# Patient Record
Sex: MALE | Race: WHITE | Employment: UNEMPLOYED | URBAN - METROPOLITAN AREA
[De-identification: names, ages, dates, MRNs, and addresses within clinical notes are randomized per-mention and may not be internally consistent; named-entity substitution may affect disease eponyms.]

---

## 2024-02-07 ENCOUNTER — APPOINTMENT (OUTPATIENT)
Dept: GENERAL RADIOLOGY | Age: 34
End: 2024-02-07
Attending: NURSE PRACTITIONER
Payer: COMMERCIAL

## 2024-02-07 ENCOUNTER — HOSPITAL ENCOUNTER (OUTPATIENT)
Age: 34
Discharge: HOME | End: 2024-02-07
Payer: COMMERCIAL

## 2024-02-07 VITALS
RESPIRATION RATE: 18 BRPM | TEMPERATURE: 99 F | HEART RATE: 94 BPM | OXYGEN SATURATION: 98 % | WEIGHT: 170 LBS | DIASTOLIC BLOOD PRESSURE: 74 MMHG | SYSTOLIC BLOOD PRESSURE: 123 MMHG

## 2024-02-07 DIAGNOSIS — J18.9 COMMUNITY ACQUIRED PNEUMONIA OF LEFT LOWER LOBE OF LUNG: Primary | ICD-10-CM

## 2024-02-07 DIAGNOSIS — R09.89 SYMPTOMS OF UPPER RESPIRATORY INFECTION (URI): ICD-10-CM

## 2024-02-07 DIAGNOSIS — R07.81 RIB PAIN ON LEFT SIDE: ICD-10-CM

## 2024-02-07 LAB
POCT INFLUENZA A: NEGATIVE
POCT INFLUENZA B: NEGATIVE
SARS-COV-2 RNA RESP QL NAA+PROBE: NOT DETECTED

## 2024-02-07 PROCEDURE — 71101 X-RAY EXAM UNILAT RIBS/CHEST: CPT | Performed by: NURSE PRACTITIONER

## 2024-02-07 PROCEDURE — U0002 COVID-19 LAB TEST NON-CDC: HCPCS | Performed by: NURSE PRACTITIONER

## 2024-02-07 PROCEDURE — S0119 ONDANSETRON 4 MG: HCPCS | Performed by: NURSE PRACTITIONER

## 2024-02-07 PROCEDURE — 87502 INFLUENZA DNA AMP PROBE: CPT | Performed by: NURSE PRACTITIONER

## 2024-02-07 PROCEDURE — 99204 OFFICE O/P NEW MOD 45 MIN: CPT | Performed by: NURSE PRACTITIONER

## 2024-02-07 RX ORDER — ONDANSETRON 4 MG/1
8 TABLET, ORALLY DISINTEGRATING ORAL ONCE
Status: COMPLETED | OUTPATIENT
Start: 2024-02-07 | End: 2024-02-07

## 2024-02-07 RX ORDER — ONDANSETRON 4 MG/1
4 TABLET, ORALLY DISINTEGRATING ORAL ONCE
Status: DISCONTINUED | OUTPATIENT
Start: 2024-02-07 | End: 2024-02-07

## 2024-02-07 RX ORDER — AMOXICILLIN 500 MG/1
1000 TABLET, FILM COATED ORAL 3 TIMES DAILY
Qty: 30 TABLET | Refills: 0 | Status: SHIPPED | OUTPATIENT
Start: 2024-02-07 | End: 2024-02-12

## 2024-02-07 RX ORDER — AZITHROMYCIN 250 MG/1
TABLET, FILM COATED ORAL
Qty: 6 TABLET | Refills: 0 | Status: SHIPPED | OUTPATIENT
Start: 2024-02-07

## 2024-02-07 NOTE — DISCHARGE INSTRUCTIONS
Pneumonia care measures   - Take antibiotics as directed and to completion   - you may benefit from taking probiotics whenever you take antibiotics   - You are considered contagious for 24 hours from starting antibiotics and/or for 24 hours after your fever (>100.4 ) resolves   - Tylenol or Ibuprofen as needed for pain and/or fever   - Drink plenty of water   - Avoid strenuous activity in the short term, and slowly progress activity as tolerated   - Deep breathing is also good for clearing the mucus from your lungs: breathe deeply five to ten times and then cough or fontenot strongly a couple of times to move the mucus.   - The lungs are a major body organ and can take a few weeks / months to regain their initial capacity after an infection   - Close follow up with care team as you may require a repeat chest x-ray in 4-6 weeks to ensure resolution of infection   - Go to ED if breathing worsens

## 2024-02-07 NOTE — ED PROVIDER NOTES
History     Chief Complaint   Patient presents with    Back Pain       Subjective:   HPI    Madhav Mitchell, 33 year old male with notable medical history of n/a who presents with Left sided thoracic pain and not feeling well. Patient reports visiting a chiropractor Monday which he has done for the past few weeks, and denied any immediate complaints. He started to develop Left sided thoracic pain that evening and reports worsening of pain with laughing and deep breathing.     Last night, he reports starting to have a fever, chills, diaphoresis and did not feel well. These symptoms persisted and worsened today. Attempted to eat this morning, but it did not sit right with him. He has been taking Ibuprofen for chills.    Denies SOB, abdominal pain.        Objective:   History reviewed. No pertinent past medical history.           History reviewed. No pertinent surgical history.             No pertinent social history.            Review of Systems   Constitutional:  Positive for chills, diaphoresis and fatigue.   Gastrointestinal:  Positive for nausea and vomiting.   All other systems reviewed and are negative.      Constitutional and vital signs reviewed.      All other systems reviewed and negative except as noted above.    I have reviewed the family history, social history, allergies, and outpatient medications.     History reviewed from EMR: Encounters, problem list, allergies, medications      Physical Exam     ED Triage Vitals [02/07/24 1030]   /74   Pulse 94   Resp 18   Temp 99 °F (37.2 °C)   Temp src Temporal   SpO2 98 %   O2 Device None (Room air)       Current:/74   Pulse 94   Temp 99 °F (37.2 °C) (Temporal)   Resp 18   Wt 77.1 kg   SpO2 98%       Physical Exam  Vitals and nursing note reviewed.   Constitutional:       General: He is not in acute distress.     Appearance: Normal appearance. He is normal weight. He is ill-appearing. He is not toxic-appearing.   HENT:      Head:  Normocephalic and atraumatic.      Right Ear: External ear normal.      Left Ear: External ear normal.      Nose: Nose normal. No congestion or rhinorrhea.      Mouth/Throat:      Mouth: Mucous membranes are moist.   Eyes:      Extraocular Movements: Extraocular movements intact.      Conjunctiva/sclera: Conjunctivae normal.      Pupils: Pupils are equal, round, and reactive to light.   Cardiovascular:      Rate and Rhythm: Normal rate.      Pulses: Normal pulses.   Pulmonary:      Effort: Pulmonary effort is normal. No respiratory distress.      Breath sounds: Normal breath sounds.      Comments: LS clear  Chest:      Comments: Left lateral thoracic pain  Musculoskeletal:         General: No swelling, tenderness or signs of injury. Normal range of motion.      Cervical back: Normal range of motion.   Skin:     General: Skin is warm and dry.      Capillary Refill: Capillary refill takes less than 2 seconds.      Coloration: Skin is pale.   Neurological:      General: No focal deficit present.      Mental Status: He is alert and oriented to person, place, and time. Mental status is at baseline.   Psychiatric:         Mood and Affect: Mood normal.         Behavior: Behavior normal.         Thought Content: Thought content normal.         Judgment: Judgment normal.            ED Course     Labs Reviewed   RAPID SARS-COV-2 BY PCR - Normal   POCT FLU TEST - Normal    Narrative:     This assay is a rapid molecular in vitro test utilizing nucleic acid amplification of influenza A and B viral RNA.     XR RIBS WITH CHEST (3 VIEWS), LEFT  (CPT=71101)   Final Result   PROCEDURE:  XR RIBS WITH CHEST (3 VIEWS), LEFT  (CPT=71101)       TECHNIQUE:  PA Chest and three views of the ribs were obtained       COMPARISON:  None.       INDICATIONS:  Left sided rib pain after chiropractic visit Monday       PATIENT STATED HISTORY: (As transcribed by Technologist)  Left sided rib    pain after chiropractic visit Monday. Non smoker. Patient  states a cough    started monday also            FINDINGS:  No displaced or nondisplaced rib fracture.  No pneumothorax or    pleural effusion.  Patchy opacities are seen corresponding to the left    lower lobe.                             =====   CONCLUSION:     1. Negative for rib fracture.   2. Patchy opacities in the left lower lobe.  These may reflect findings of    lobar pneumonia especially with the given symptoms of cough.  Follow-up    recommended to ensure resolution.             LOCATION:  [facility]                   Dictated by (CST): Isaias Del Toro MD on 2/07/2024 at 11:06 AM        Finalized by (CST): Isaias Del Toro MD on 2/07/2024 at 11:08 AM             Vitals:    02/07/24 1030   BP: 123/74   Pulse: 94   Resp: 18   Temp: 99 °F (37.2 °C)   TempSrc: Temporal   SpO2: 98%   Weight: 77.1 kg            [facility]        Madhav Mitchell, 33 year old male with medical history as noted above who presents with thoracic pain and not feeling well   - Patient in NAD, VSS, appears unwell   - gastroenteritis vs viral illness vs intercostal strain vs fracture vs PNA vs other   - Patient with emesis episode during interview   - viral swabs and CXR ordered   - Zofran 8mng ODT ordered       ** See ED course for additional information on care provided / interventions / notable events throughout patient's encounter.    ** See below for home care instructions (if applicable)    ED Course as of 02/07/24 1118  ------------------------------------------------------------  Time: 02/07 1109  Comment: Self read of Xray suspicious for Left sided opacity. Awaiting official read.  ------------------------------------------------------------  Time: 02/07 1117  Comment: Radiology noting LLL opacity. Given HPI and exam and appearance, suggestive of PNA.   Supportive care and infection control measures discussed  Work note provided  Advised to establish care with primary care provider        I have independently reviewed the radiology images,  clinical lab results, and ECG tracings as described above (if applicable)        Medical Decision Making  Amount and/or Complexity of Data Reviewed  Labs: ordered. Decision-making details documented in ED Course.  Radiology: ordered and independent interpretation performed. Decision-making details documented in ED Course.    Risk  OTC drugs.  Prescription drug management.        Disposition and Plan     Clinical Impression:  1. Community acquired pneumonia of left lower lobe of lung    2. Rib pain on left side    3. Symptoms of upper respiratory infection (URI)         Disposition:  Discharge  2/7/2024 11:16 am    Follow-up:  [facility], [facility], [place] [address] [facility] [address]  [facility] [id number] [phone]    New primary care provider referral (if needed)        Medications Prescribed:  Current Discharge Medication List        START taking these medications    Details   amoxicillin 500 MG Oral Tab Take 2 tablets (1,000 mg total) by mouth 3 (three) times daily for 5 days.  Qty: 30 tablet, Refills: 0      azithromycin (ZITHROMAX Z-JOMAR) 250 MG Oral Tab 500 mg once followed by 250 mg daily x 4 days  Qty: 6 tablet, Refills: 0             The above patient (and/or guardian) was made aware that an appropriate evaluation has been performed, and that no additional testing is required at this time. In my medical judgment, there is currently no evidence of an immediate life-threatening or surgical condition, therefore discharge is indicated at this time. The patient (and/or guardian) was advised that a small risk still exists that a serious condition could develop. The patient was instructed to arrange close follow-up with their primary care provider (or the referral provider given today). The patient received written and verbal instructions regarding their condition / concerns, demonstrated understanding, and is agreement with the outpatient treatment plan.        Home care  instructions:    Pneumonia care measures   - Take antibiotics as directed and to completion   - you may benefit from taking probiotics whenever you take antibiotics   - You are considered contagious for 24 hours from starting antibiotics and/or for 24 hours after your fever (>100.4 ) resolves   - Tylenol or Ibuprofen as needed for pain and/or fever   - Drink plenty of water   - Avoid strenuous activity in the short term, and slowly progress activity as tolerated   - Deep breathing is also good for clearing the mucus from your lungs: breathe deeply five to ten times and then cough or fontenot strongly a couple of times to move the mucus.   - The lungs are a major body organ and can take a few weeks / months to regain their initial capacity after an infection   - Close follow up with care team as you may require a repeat chest x-ray in 4-6 weeks to ensure resolution of infection   - Go to ED if breathing worsens      Dagoberto George, DNP, APRN, AGACNP-BC, FNP-C, CNL  Adult-Gerontology Acute Care & Family Nurse Practitioner  [facility]

## 2024-02-07 NOTE — ED INITIAL ASSESSMENT (HPI)
Saw chiropractor on Monday   Now having left sided rib pain and back pain  Constant pain worse with movement  Painful full breathe

## 2024-03-28 ENCOUNTER — HOSPITAL ENCOUNTER (OUTPATIENT)
Age: 34
Discharge: HOME | End: 2024-03-28
Payer: COMMERCIAL

## 2024-03-28 VITALS
RESPIRATION RATE: 18 BRPM | DIASTOLIC BLOOD PRESSURE: 73 MMHG | WEIGHT: 175 LBS | SYSTOLIC BLOOD PRESSURE: 137 MMHG | TEMPERATURE: 98 F | HEART RATE: 72 BPM | OXYGEN SATURATION: 96 %

## 2024-03-28 DIAGNOSIS — H10.31 ACUTE BACTERIAL CONJUNCTIVITIS OF RIGHT EYE: Primary | ICD-10-CM

## 2024-03-28 PROCEDURE — 99213 OFFICE O/P EST LOW 20 MIN: CPT | Performed by: PHYSICIAN ASSISTANT

## 2024-03-28 RX ORDER — POLYMYXIN B SULFATE AND TRIMETHOPRIM 1; 10000 MG/ML; [USP'U]/ML
1 SOLUTION OPHTHALMIC
Qty: 10 ML | Refills: 0 | Status: SHIPPED | OUTPATIENT
Start: 2024-03-28 | End: 2024-03-28

## 2024-03-28 NOTE — ED PROVIDER NOTES
Patient Seen in: [facility]      History     Chief Complaint   Patient presents with    Eye Problem     Stated Complaint: right eye redness x this am    Subjective:   HPI    33-year-old male who comes in today complaining of redness and discharge from the right eye.  Nephew was here and had a conjunctivitis.  He states he woke up today to the symptoms.  Denies any other symptoms.  Denies double vision or eye pain    Objective:   History reviewed. No pertinent past medical history.           No pertinent past surgical history.              No pertinent social history.            Review of Systems    Positive for stated complaint: right eye redness x this am  Other systems are as noted in HPI.  Constitutional and vital signs reviewed.      All other systems reviewed and negative except as noted above.    Physical Exam     ED Triage Vitals [03/28/24 1442]   /73   Pulse 72   Resp 18   Temp 97.5 °F (36.4 °C)   Temp src Temporal   SpO2 96 %   O2 Device None (Room air)       Current:/73   Pulse 72   Temp 97.5 °F (36.4 °C) (Temporal)   Resp 18   Wt 79.4 kg   SpO2 96%     Right Eye Chart Acuity: 20/25, Uncorrected  Left Eye Chart Acuity: 20/25, Uncorrected    Physical Exam      General Appearance: Alert, cooperative, no SOB or labored breathing, appropriate for age   Head: Normocephalic, without obvious abnormality   Eyes: EOMI without nystagmus. PERRLA. Right eye: mild conjunctival injection.  No obvious foreign body. No surrounding erythema or edema.       Ears: Bilateral:TM clear and pearly gray color. No external auditory canal edema or discharge. No pinna, tragus, or mastoid TTP.  Nose: Nares symmetrical, No maxillary or frontal sinus tenderness   Throat: Lips, tongue, and mucosa are moist, pink, and intact; teeth intact. No erythema, edema, exudates of posterior pharynx. Uvula midline, palate symmetrical  Neck: Supple; no lymphadenopathy   Skin:  No rashes.        ED Course    Labs Reviewed - No data to display            MDM             Medical Decision Making  33-year-old male who comes in today complaining of right eye redness and discharge that started this morning.  Nephew recently was over and had pinkeye.  Patient does not wear contact lenses    Problems Addressed:  Acute bacterial conjunctivitis of right eye: acute illness or injury    Risk  OTC drugs.  Prescription drug management.  Risk Details: Clinical diagnosis: Acute conjunctivitis    Differential diagnosis includes viral conjunctivitis, bacterial conjunctivitis, orbital cellulitis    Patient has no surrounding erythema, has purulent yellow drainage, will start on  antibiotic drops, warm compresses good handwashing, if no improvement be re-evaluated in 48 hours        Disposition and Plan     Clinical Impression:  1. Acute bacterial conjunctivitis of right eye         Disposition:  Discharge  3/28/2024  3:01 pm    Follow-up:  Jessica Allen MD  [address] [address]  [phone]    Schedule an appointment as soon as possible for a visit   If symptoms worsen          Medications Prescribed:  Discharge Medication List as of 3/28/2024  3:09 PM        START taking these medications    Details   polymyxin B-trimethoprim 32370-0.1 UNIT/ML-% Ophthalmic Solution Apply 1 drop to eye Q3H While Awake for 5 days., Normal, Disp-10 mL, R-0             This report has been produced using speech recognition software and may contain errors related to that system including, but not limited to, errors in grammar, punctuation, and spelling, as well as words and phrases that possibly may have been recognized inappropriately.  If there are any questions or concerns, contact the dictating provider for clarification.     NOTE: The 21st Century Cares Act makes medical notes available to patients.  Be advised that this is a medical document written in medical language and may contain abbreviations or verbiage that is  unfamiliar or direct.  It is primarily intended to carry relevant historical information, physical exam findings, and the clinical assessment of the physician.

## 2024-03-28 NOTE — ED INITIAL ASSESSMENT (HPI)
Pt c/o redness to his right eye. Pt states when he awoke his right eye was crusted over. Pt denies visual issue.

## (undated) NOTE — LETTER
Date & Time: 2/7/2024, 11:16 AM  Patient: Madhav Mitchell  Encounter Provider(s):    Dagoberto George APRN       To Whom It May Concern:    Madhav Mitchell was seen and treated in our department on 02/07/24. Please excuse him from work until 02/09/24 when he can return if without fever (<100.4) and if feeling better.    If you have any questions or concerns, please do not hesitate to call.        ________________________________________________  Dagoberto George DNP, HAYDER, AGACNP-BC, FNP-C, CNL  Adult-Gerontology Acute Care & Family Nurse Practitioner